# Patient Record
Sex: FEMALE | Race: WHITE | Employment: FULL TIME | URBAN - METROPOLITAN AREA
[De-identification: names, ages, dates, MRNs, and addresses within clinical notes are randomized per-mention and may not be internally consistent; named-entity substitution may affect disease eponyms.]

---

## 2022-07-05 ENCOUNTER — HOSPITAL ENCOUNTER (OUTPATIENT)
Dept: ULTRASOUND IMAGING | Age: 57
Discharge: HOME | End: 2022-07-05
Payer: COMMERCIAL

## 2022-07-05 DIAGNOSIS — Z85.3 PERSONAL HISTORY OF MALIGNANT NEOPLASM OF BREAST: ICD-10-CM

## 2022-07-05 PROCEDURE — 76700 US EXAM ABDOM COMPLETE: CPT

## 2022-07-28 NOTE — PROGRESS NOTES
..1.  Do not eat or drink anything after 12 midnight prior to surgery. This includes no water, chewing gum or mints, except for bowel prep complete per MD.  2.  Take the following pills with a small sip of water on the morning of surgery 08/05/2022.  3.  Aspirin, Ibuprofen, Advil, Naproxen, Vitamin E and other Anti-inflammatory products should be stopped for 5 days before surgery or as directed by your physician. 4.  Check with your doctor regarding stopping Plavix, Coumadin, Lovenox, Fragmin or other blood thinners. 5.  Do not smoke and do not drink alcoholic beverages 24 hours prior to surgery. This includes NA Beer. 6.  You may brush your teeth and gargle the morning of surgery. DO NOT SWALLOW WATER.  7.  You MUST make arrangements for a responsible adult to take you home after your surgery. You will not be allowed to leave alone or drive yourself home. It is strongly suggested someone stay with you the first 24 hours. Your surgery will be cancelled if you do not have a ride home. 8.  A parent/legal guardian must accompany a child scheduled for surgery and plan to stay at the hospital until the child is discharged. Please do not bring other children with you. 9.  Please wear simple, loose fitting clothing to the hospital.  Dieter Bars not bring valuables ( money, credit cards, checkbooks, etc.)  Do not wear any makeup (including no eye makeup) or nail polish on your fingers or toes. 10.  Do not wear any jewelry or piercing on the day of surgery. All body piercing jewelry must be removed. 11.  If you have dentures, they will be removed before going to the OR; we will provide you a container. If you wear contact lenses or glasses, they will be removed; please bring a case for them.   15.  Notify your Surgeon if you develop any illness between now and surgery time; cough, cold, fever, sore throat, nausea, vomiting, etc.  Please notify your surgeon if you experience dizziness, shortness of breath or blurred vision between now and the time of your surgery. To provide excellent care, visitors will be limited to two in a room at any given time. Please no children under the age of 15 in the surgical department.

## 2022-07-28 NOTE — PROGRESS NOTES
PAT completed with patient orders placed per MD, patient states her mother Jerrica Durand will be  and caregiver day of procedure and that 's office called her 07/27/2022 and told her procedure is scheduled for 1000 with 0900 arrival time. Sary Cisse RN

## 2022-08-01 ENCOUNTER — ANESTHESIA EVENT (OUTPATIENT)
Dept: ENDOSCOPY | Age: 57
End: 2022-08-01
Payer: COMMERCIAL

## 2022-08-05 ENCOUNTER — ANESTHESIA (OUTPATIENT)
Dept: ENDOSCOPY | Age: 57
End: 2022-08-05
Payer: COMMERCIAL

## 2022-08-05 NOTE — PROGRESS NOTES
New dateand time given for procedure. 8/9/22 arrive at 0930 Instructed to be NPO except for thyroid and stomach medicine. Verbalized understanding.

## 2022-08-09 ENCOUNTER — HOSPITAL ENCOUNTER (OUTPATIENT)
Age: 57
Setting detail: OUTPATIENT SURGERY
Discharge: HOME | End: 2022-08-09
Attending: INTERNAL MEDICINE | Admitting: INTERNAL MEDICINE
Payer: COMMERCIAL

## 2022-08-09 VITALS
DIASTOLIC BLOOD PRESSURE: 66 MMHG | RESPIRATION RATE: 16 BRPM | OXYGEN SATURATION: 99 % | HEIGHT: 62 IN | TEMPERATURE: 98 F | HEART RATE: 82 BPM | SYSTOLIC BLOOD PRESSURE: 100 MMHG | BODY MASS INDEX: 34.96 KG/M2 | WEIGHT: 190 LBS

## 2022-08-09 DIAGNOSIS — K31.89 GASTROPTOSIS: ICD-10-CM

## 2022-08-09 PROCEDURE — 2580000003 HC RX 258: Performed by: NURSE ANESTHETIST, CERTIFIED REGISTERED

## 2022-08-09 PROCEDURE — 3700000001 HC ADD 15 MINUTES (ANESTHESIA): Performed by: INTERNAL MEDICINE

## 2022-08-09 PROCEDURE — 88305 TISSUE EXAM BY PATHOLOGIST: CPT

## 2022-08-09 PROCEDURE — 7100000010 HC PHASE II RECOVERY - FIRST 15 MIN: Performed by: INTERNAL MEDICINE

## 2022-08-09 PROCEDURE — 3609013500 HC EGD REMOVAL TUMOR POLYP/OTHER LESION SNARE TECH: Performed by: INTERNAL MEDICINE

## 2022-08-09 PROCEDURE — 7100000011 HC PHASE II RECOVERY - ADDTL 15 MIN: Performed by: INTERNAL MEDICINE

## 2022-08-09 PROCEDURE — 6360000002 HC RX W HCPCS: Performed by: NURSE ANESTHETIST, CERTIFIED REGISTERED

## 2022-08-09 PROCEDURE — 2709999900 HC NON-CHARGEABLE SUPPLY: Performed by: INTERNAL MEDICINE

## 2022-08-09 PROCEDURE — 3609018500 HC EGD US SCOPE W/ADJACENT STRUCTURES: Performed by: INTERNAL MEDICINE

## 2022-08-09 PROCEDURE — 3700000000 HC ANESTHESIA ATTENDED CARE: Performed by: INTERNAL MEDICINE

## 2022-08-09 RX ORDER — SODIUM CHLORIDE, SODIUM LACTATE, POTASSIUM CHLORIDE, CALCIUM CHLORIDE 600; 310; 30; 20 MG/100ML; MG/100ML; MG/100ML; MG/100ML
INJECTION, SOLUTION INTRAVENOUS CONTINUOUS PRN
Status: DISCONTINUED | OUTPATIENT
Start: 2022-08-09 | End: 2022-08-09 | Stop reason: SDUPTHER

## 2022-08-09 RX ORDER — PROPOFOL 10 MG/ML
INJECTION, EMULSION INTRAVENOUS PRN
Status: DISCONTINUED | OUTPATIENT
Start: 2022-08-09 | End: 2022-08-09 | Stop reason: SDUPTHER

## 2022-08-09 RX ORDER — SODIUM CHLORIDE, SODIUM LACTATE, POTASSIUM CHLORIDE, CALCIUM CHLORIDE 600; 310; 30; 20 MG/100ML; MG/100ML; MG/100ML; MG/100ML
INJECTION, SOLUTION INTRAVENOUS CONTINUOUS
Status: DISCONTINUED | OUTPATIENT
Start: 2022-08-09 | End: 2022-08-09 | Stop reason: HOSPADM

## 2022-08-09 RX ORDER — SODIUM CHLORIDE 9 MG/ML
INJECTION, SOLUTION INTRAVENOUS PRN
Status: DISCONTINUED | OUTPATIENT
Start: 2022-08-09 | End: 2022-08-09 | Stop reason: HOSPADM

## 2022-08-09 RX ORDER — FAMOTIDINE 10 MG/ML
20 INJECTION, SOLUTION INTRAVENOUS ONCE
Status: DISCONTINUED | OUTPATIENT
Start: 2022-08-09 | End: 2022-08-09 | Stop reason: HOSPADM

## 2022-08-09 RX ORDER — PROPOFOL 10 MG/ML
INJECTION, EMULSION INTRAVENOUS CONTINUOUS PRN
Status: DISCONTINUED | OUTPATIENT
Start: 2022-08-09 | End: 2022-08-09 | Stop reason: SDUPTHER

## 2022-08-09 RX ORDER — SODIUM CHLORIDE 0.9 % (FLUSH) 0.9 %
5-40 SYRINGE (ML) INJECTION EVERY 12 HOURS SCHEDULED
Status: DISCONTINUED | OUTPATIENT
Start: 2022-08-09 | End: 2022-08-09 | Stop reason: HOSPADM

## 2022-08-09 RX ORDER — SODIUM CHLORIDE 0.9 % (FLUSH) 0.9 %
5-40 SYRINGE (ML) INJECTION PRN
Status: DISCONTINUED | OUTPATIENT
Start: 2022-08-09 | End: 2022-08-09 | Stop reason: HOSPADM

## 2022-08-09 RX ADMIN — PROPOFOL 100 MG: 10 INJECTION, EMULSION INTRAVENOUS at 11:29

## 2022-08-09 RX ADMIN — PROPOFOL 50 MG: 10 INJECTION, EMULSION INTRAVENOUS at 11:32

## 2022-08-09 RX ADMIN — PROPOFOL 125 MCG/KG/MIN: 10 INJECTION, EMULSION INTRAVENOUS at 11:29

## 2022-08-09 RX ADMIN — SODIUM CHLORIDE, POTASSIUM CHLORIDE, SODIUM LACTATE AND CALCIUM CHLORIDE: 600; 310; 30; 20 INJECTION, SOLUTION INTRAVENOUS at 11:25

## 2022-08-09 ASSESSMENT — PAIN - FUNCTIONAL ASSESSMENT: PAIN_FUNCTIONAL_ASSESSMENT: 0-10

## 2022-08-09 NOTE — ANESTHESIA PRE PROCEDURE
Department of Anesthesiology  Preprocedure Note       Name:  Kenroy Matias   Age:  55 y.o.  :  1965                                          MRN:  [id number]         Date:  2022      Surgeon: Jasmin Ramirez):  Destiney Gaona MD    Procedure: Procedure(s):  UPPER EUS (11:00)  EGD W/ANES. Medications prior to admission:   Prior to Admission medications    Medication Sig Start Date End Date Taking? Authorizing Provider   atorvastatin (LIPITOR) 10 MG tablet Take 10 mg by mouth daily    Historical Provider, MD   pantoprazole (PROTONIX) 40 MG tablet Take 40 mg by mouth daily. Historical Provider, MD   venlafaxine (EFFEXOR) 75 MG tablet Take 100 mg by mouth 2 times daily. Historical Provider, MD   levothyroxine (SYNTHROID) 25 MCG tablet Take 137 mcg by mouth Daily. Historical Provider, MD   lisinopril-hydrochlorothiazide (PRINZIDE;ZESTORETIC) 10-12.5 MG per tablet Take 1 tablet by mouth daily. Historical Provider, MD   Multiple Minerals-Vitamins CAPS Take  by mouth. Historical Provider, MD       Current medications:    Current Facility-Administered Medications   Medication Dose Route Frequency Provider Last Rate Last Admin    lactated ringers infusion   IntraVENous Continuous Destiney Gaona MD        famotidine (PEPCID) injection 20 mg  20 mg IntraVENous Once Pascale Schmidt MD        lactated ringers infusion   IntraVENous Continuous Pascale Schmidt MD        sodium chloride flush 0.9 % injection 5-40 mL  5-40 mL IntraVENous 2 times per day Pascale Schmidt MD        sodium chloride flush 0.9 % injection 5-40 mL  5-40 mL IntraVENous PRN Pascale Schmidt MD        0.9 % sodium chloride infusion   IntraVENous PRN Pascale Schmidt MD           Allergies: Allergies   Allergen Reactions    Etodolac Itching     Pt. Does not remember the reaction.  Lisinopril      cough    Naproxen Itching     Pt.  Does not remember reaction       Problem List: Patient Active Problem List   Diagnosis Code    Condyloma A63.0    Plantar fasciitis, left M72.2       Past Medical History:        Diagnosis Date    Anxiety     Arthritis     Breast cancer ([address]     Depression     Hypertension     Thyroid disease        Past Surgical History:        Procedure Laterality Date    APPENDECTOMY      CHOLECYSTECTOMY      HYSTERECTOMY (CERVIX STATUS UNKNOWN)      MASTECTOMY, MODIFIED RADICAL Bilateral     TONSILLECTOMY         Social History:    Social History     Tobacco Use    Smoking status: Former     Years: 30.00     Types: Cigarettes    Smokeless tobacco: Never   Substance Use Topics    Alcohol use: No                                Counseling given: Not Answered      Vital Signs (Current):   Vitals:    07/28/22 1326 08/09/22 0931   BP:  117/77   Pulse:  88   Resp:  18   Temp:  97.9 °F (36.6 °C)   TempSrc:  Tympanic   SpO2:  97%   Weight: 190 lb (86.2 kg)    Height: 5' 1.5\" (1.562 m)                                               BP Readings from Last 3 Encounters:   08/09/22 117/77   06/12/17 122/74   06/13/16 120/72       NPO Status: Time of last liquid consumption: 2230                        Time of last solid consumption: 1800                        Date of last liquid consumption: 08/08/22                        Date of last solid food consumption: 08/08/22    BMI:   Wt Readings from Last 3 Encounters:   07/28/22 190 lb (86.2 kg)   06/12/17 209 lb 9.6 oz (95.1 kg)   06/13/16 208 lb 6.4 oz (94.5 kg)     Body mass index is 35.32 kg/m².     CBC:   Lab Results   Component Value Date/Time    WBC 8.5 06/12/2017 11:38 AM    RBC 5.17 06/12/2017 11:38 AM    HGB 15.8 06/12/2017 11:38 AM    HCT 49.3 06/12/2017 11:38 AM    MCV 95.3 06/12/2017 11:38 AM    RDW 11.5 06/12/2017 11:38 AM     06/12/2017 11:38 AM       CMP:   Lab Results   Component Value Date/Time     06/12/2017 11:38 AM    K 4.3 06/12/2017 11:38 AM     06/12/2017 11:38 AM    CO2 29 06/12/2017 11:38 AM    BUN 17 06/12/2017 11:38 AM    CREATININE 0.81 06/12/2017 11:38 AM    AGRATIO 1.6 06/12/2017 11:38 AM    LABGLOM >60.0 06/12/2017 11:38 AM    GLUCOSE 106 06/12/2017 11:38 AM    PROT 7.8 06/12/2017 11:38 AM    CALCIUM 10.4 06/12/2017 11:38 AM    BILITOT 0.9 06/12/2017 11:38 AM    ALKPHOS 114 06/12/2017 11:38 AM    AST 35 06/12/2017 11:38 AM    ALT 53 06/12/2017 11:38 AM       POC Tests: No results for input(s): POCGLU, POCNA, POCK, POCCL, POCBUN, POCHEMO, POCHCT in the last 72 hours. Coags: No results found for: PROTIME, INR, APTT    HCG (If Applicable): No results found for: PREGTESTUR, PREGSERUM, HCG, HCGQUANT     ABGs: No results found for: PHART, PO2ART, BFJ8LLO, GWA2BGX, BEART, Z0VMIBSN     Type & Screen (If Applicable):  No results found for: LABABO, LABRH    Drug/Infectious Status (If Applicable):  No results found for: HIV, HEPCAB    COVID-19 Screening (If Applicable): No results found for: COVID19        Anesthesia Evaluation  Patient summary reviewed and Nursing notes reviewed no history of anesthetic complications:   Airway: Mallampati: II     Neck ROM: full     Dental:          Pulmonary:Negative Pulmonary ROS and normal exam                               Cardiovascular:Negative CV ROS    (+) hypertension:,                   Neuro/Psych:   Negative Neuro/Psych ROS  (+) psychiatric history:depression/anxiety             GI/Hepatic/Renal: Neg GI/Hepatic/Renal ROS       (-) hiatal hernia and GERD       Endo/Other: Negative Endo/Other ROS   (+) hypothyroidism: arthritis:., .                 Abdominal:             Vascular: Other Findings:           Anesthesia Plan      general     ASA 3     (I discussed with the patient the risks and benefits of PIV, general anesthesia, IV Narcotics, PACU. All questions were answered the patient agrees with the plan and wishes to proceed.  )  Induction: intravenous.                             Keyur Cherry MD   8/9/2022

## 2022-08-09 NOTE — H&P
[address] [facility] [address],  [address], [address] [place]  Phone: [phone]  [facility]  Phone: [phone]   WWL:[phone]    CHIEF COMPLAINT     Gastric submucosal nodule    HPI     Thank you Dr. Wilmer Schmidt for asking me to see Chidi Retana in consultation. Chidi Retana is a 55 y.o. female Single [1] White (non-) [1] with medical history of depression, anxiety, hypertension, hypothyroidism, breast cancer status post chemotherapy and mastectomy in remission, tobacco use, lactose intolerance and abdominal surgeries of appendectomy and cholecystectomy seen with referral for EUG to evaluate gastric submucosal nodule seen on EGD 5/23/22. Patient follows with GI, Dr. Genaro Crump for abdominal pain and diarrhea. Denies abdominal pain, nausea, vomiting, fever, chills, unintentional weight loss, constipation, hematochezia or melenic stools. Last EGD 5/23/22 Dr. Genaro Crump: Mild erosive antritis (HP/IM negative). Small 5 mm submucosal nodule in the lesser curvature of antrum. Normal duodenum    Last colonoscopy 5/23/22 Dr. Genaro Crump:  Left-sided diverticulosis. Normal terminal ileum. 2 small rectal hyperplastic polyps. Random colon biopsies (normal)      Review of available records reveals:   Wt Readings from Last 50 Encounters:   07/28/22 190 lb (86.2 kg)   06/12/17 209 lb 9.6 oz (95.1 kg)   06/13/16 208 lb 6.4 oz (94.5 kg)   02/18/16 205 lb (93 kg)   11/18/14 203 lb (92.1 kg)       No components found for: HGBA1C  BP Readings from Last 3 Encounters:   08/09/22 117/77   06/12/17 122/74   06/13/16 120/72     Health Maintenance   Topic Date Due    Depression Screen  Never done    HIV screen  Never done    Hepatitis C screen  Never done    Diabetes screen  Never done    Breast cancer screen  Never done    Colorectal Cancer Screen  Never done    COVID-19 Vaccine (4 - Booster for Flores Peter series) 04/02/2022    Flu vaccine (1) 09/01/2022    Lipids 07/27/2023    DTaP/Tdap/Td vaccine (3 - Td or Tdap) 08/10/2031    Shingles vaccine  Completed    Hepatitis A vaccine  Aged Out    Hepatitis B vaccine  Aged Out    Hib vaccine  Aged Out    Meningococcal (ACWY) vaccine  Aged Out    Pneumococcal 0-64 years Vaccine  Aged Out       No components found for: [facility]     PAST MEDICAL HISTORY     Past Medical History:   Diagnosis Date    Anxiety     Arthritis     Breast cancer ([address]     Depression     Hypertension     Thyroid disease      FAMILY HISTORY     Family History   Problem Relation Age of Onset    Heart Disease Maternal Grandfather     Cancer Paternal Grandmother     Cancer Paternal Grandfather      SOCIAL HISTORY     Social History     Socioeconomic History    Marital status: Single     Spouse name: Not on file    Number of children: Not on file    Years of education: Not on file    Highest education level: Not on file   Occupational History    Not on file   Tobacco Use    Smoking status: Former     Years: 30.00     Types: Cigarettes    Smokeless tobacco: Never   Vaping Use    Vaping Use: Every day   Substance and Sexual Activity    Alcohol use: No    Drug use: Not on file    Sexual activity: Not on file   Other Topics Concern    Not on file   Social History Narrative    Not on file     Social Determinants of Health     Financial Resource Strain: Not on file   Food Insecurity: Not on file   Transportation Needs: Not on file   Physical Activity: Not on file   Stress: Not on file   Social Connections: Not on file   Intimate Partner Violence: Not on file   Housing Stability: Not on file     SURGICAL HISTORY     Past Surgical History:   Procedure Laterality Date    APPENDECTOMY      CHOLECYSTECTOMY      HYSTERECTOMY (CERVIX STATUS UNKNOWN)      MASTECTOMY, MODIFIED RADICAL Bilateral     TONSILLECTOMY       CURRENT MEDICATIONS   (This list may include medications prescribed during this encounter as epic can not insert only the list prior to this encounter.)  REM    ALLERGIES     Allergies   Allergen Reactions    Etodolac Itching     Pt. Does not remember the reaction. Lisinopril      cough    Naproxen Itching     Pt. Does not remember reaction     IMMUNIZATIONS     Immunization History   Administered Date(s) Administered    COVID-19, PFIZER PURPLE top, DILUTE for use, (age 15 y+), 30mcg/0.3mL 02/26/2021, 03/18/2021, 12/02/2021     REVIEW OF SYSTEMS   See HPI for further details and pertinent postiives. Negative for the following:  Constitutional: Negative for weight change. Negative for appetite change and fatigue. HENT: Negative for nosebleeds, sore throat, mouth sores, and voice change. Respiratory: Negative for cough, choking and chest tightness. Cardiovascular: Negative for chest pain   Gastrointestinal: See HPI  Musculoskeletal: Negative for arthralgias. Skin: Negative for pallor. Neurological: Negative for weakness and light-headedness. Hematological: Negative for adenopathy. Does not bruise/bleed easily. Psychiatric/Behavioral: Negative for suicidal ideas. PHYSICAL EXAM   VITAL SIGNS: /77   Pulse 88   Temp 97.9 °F (36.6 °C) (Tympanic)   Resp 18   Ht 5' 1.5\" (1.562 m)   Wt 190 lb (86.2 kg)   SpO2 97%   BMI 35.32 kg/m²   Wt Readings from Last 3 Encounters:   07/28/22 190 lb (86.2 kg)   06/12/17 209 lb 9.6 oz (95.1 kg)   06/13/16 208 lb 6.4 oz (94.5 kg)     Constitutional: Well developed, Well nourished, No acute distress, Non-toxic appearance. HENT: Normocephalic, Atraumatic, Bilateral external ears normal, Oropharynx moist, No oral exudates, Nose normal.   Eyes: Conjunctiva normal, No discharge. Neck: Normal range of motion, No tenderness  Cardiovascular: Normal heart rate, Normal rhythm, No murmurs, No rubs, No gallops. Thorax & Lungs: Normal breath sounds, No respiratory distress, No wheezing,   Abdomen:  normal bowel sounds, soft, non tender, non distended,no hernias   Rectal:  Deferred.   Skin: Warm, Dry, No erythema, No rash. No bruising. Lower Extremities: Intact distal pulses, No edema, No tenderness, No cyanosis, No clubbing. Neurologic: Alert & oriented x 3  RADIOLOGY/PROCEDURES   No results found. FINAL IMPRESSION   Gastric antral submucosal nodule    Plan: Upper EUS    Esophagogastroduodenoscopy (EGD) and Endoscopic Ultrasonography (EUS) alternatives, rationale, benefits and risks, not limited to bleeding, infection, perforation, need of surgery, prolong hospital stay and anesthesia side effects were explained. Additional risk of pancreatitis if fine needle aspiration (FNA) is performed. Patient verbalized understanding and agrees to proceed with EGD/EUS. ORDERED FUTURE/PENDING TESTS     Lab Frequency Next Occurrence       FOLLOWUP   No follow-ups on file.           Bayron Youssef MD 8/4/22 9:06 PM EDT    CC:  Dio Pickard MD

## 2022-08-09 NOTE — DISCHARGE INSTRUCTIONS
PATIENT INSTRUCTIONS  POST-SEDATION    Clarice Patrick          IMMEDIATELY FOLLOWING PROCEDURE:    Do not drive or operate machinery for the first twenty four hours after surgery. Do not make any important decisions for twenty four hours after surgery or while taking narcotic pain medications or sedatives. You should NOT BE LEFT UNATTENDED OR ALONE. A responsible adult should be with you for the rest of the day of your procedure and also during the night for your protection and safety. You may experience some light headedness. Rest at home with activity as tolerated. You may not need to go to bed, but it is important to rest for the next 24 hours. You should not engage in athletic sports such as basketball, volleyball, jogging, skating, or activities requiring refined motor skills for 24 hours. If you develop intractable nausea and vomiting or a severe headache please notify your doctor immediately. You are not expected to have any fever, but if you feel warm, take your temperature. If you have a fever 101 degrees or higher, call your doctor. If you have had an Endoscopy:   *Eat lightly for your first meal and gradually resume your normal / prescribed diet. DO NOT eat or drink until your gag reflex returns. *If you have a sore throat you may use lozenges, or salt water gargles. ONCE YOU ARE HOME, IF YOU SHOULD HAVE:  Difficulty in breathing, persistent nausea or vomiting, bleeding you feel is excessive, or pain that is unusual, increased abdominal bloating, or any swelling, fever / chills, call your physician. If you cannot contact your physician, but feel that your signs and symptoms need a physician's attention, go to the Emergency Department. FOLLOW-UP:    Please follow up with Dr. Donovan Dailey as scheduled or needed. Dr. Rashel Stratton office will call you with the biopsy findings. Call Dr. Aneudy Estrada if there are any GI concerns.  [phone]  ANESTHESIA DISCHARGE INSTRUCTIONS    You are under the influence of drugs- do not drink alcohol, drive a car, operate machinery(such as power tools, kitchen appliances, etc), sign legal documents, or make any important decisions for 24 hours (or while on pain medications). Children should not ride bikes or [place] or play on gym sets  for 24 hours after surgery. A responsible adult should be with you for 24 hours. Rest at home today- increase activity as tolerated. Progress slowly to a regular diet unless your physician has instructed you otherwise. Drink plenty of water. CALL YOUR DOCTOR IF YOU:  Have moderate to severe nausea or vomiting AND are unable to hold down fluids or prescribed medications. Have bright red bloody drainage from your dressing that won't stop oozing. Do not get relief with your pain medication    NORMAL (POSSIBLE) SIDE EFFECTS FROM ANESTHESIA:     Confusion, temporary memory loss, delayed reaction times in the first 24 hours  Lightheadedness, dizziness, difficulty focusing, blurred vision  Nausea/vomiting can happen  Shivering, feeling cold, sore throat, cough and muscle aches should stop within 24-48 hours  Trouble urinating - call your surgeon if it has been more than 8 hrs  Bruising or soreness at the IV site - call if it remains red, firm or there is drainage             FEMALES OF CHILDBEARING AGE WHO ARE TAKING BIRTH CONTROL PILLS:  You may have received a medication during your procedure that interferes with the   actions of birth control pills (Bridion or Emend). Use some other kind of birth control in addition to your pills, like a condom, for 1 month after your procedure to prevent unwanted pregnancy. The following instructions are to be followed if you have a known history or diagnosis of sleep apnea: For all sleep apnea patients:  ? Sleep on your side or sitting up in a chair whenever possible, especially the first 24 hours after surgery. ? Use only medicines prescribed by your doctor.     ? Do not drink alcohol. ? If you have a dental device to assist you while at rest, use it at all times for the first 24 hours. For patients using CPAP machines:  ? Use your CPAP machine during all periods of sleep as usual.  ? Use your CPAP machine during all periods of daytime rest while on pain medicines. ** Follow up with your primary care doctor for continued care. IF YOU DO NOT TAKE ALL OF YOUR NARCOTIC PAIN MEDICATION, please dispose of them responsibly. There are drop off boxes in the Emergency Departments 24/7 at both [facility] and [facility]. If these locations are not convenient, other options for discarding them can be found at:  [email or web address]    Hospital or office staff may NOT accept any medications to drop off in the cabinet for you.

## 2022-08-09 NOTE — OP NOTE
[address] [facility] [address],  [address]  9 [facility], [address] [place] [address]  Phone: [phone]   IED:[phone]   [address]  [facility]  Phone: [phone]   PJE:[phone]    Esophagogastroduodenoscopy with snare cautery polypectomy: Upper Endoscopic Ultrasound Procedure Note    Patient: River Almanzar  : 1965    Procedure: EGD with snare cautery polypectomy, Upper EUS    Date:  2022     Endoscopist:  Gabby Lofton MD    Referring Physician:  Peyton Richey MD    Preoperative Diagnosis:  gastric antral nodule    Postoperative Diagnosis: duodenal bulb polyp, gastric antral lipoma    Anesthesia: Anesthesia: MAC  ASA Class: 2  Mallampati: II (soft palate, uvula, fauces visible)    Indications: This is a 55y.o. year old female with medical history of depression, anxiety, hypertension, hypothyroidism, breast cancer status post chemotherapy and mastectomy in remission, tobacco use, lactose intolerance and abdominal surgeries of appendectomy and cholecystectomy seen with referral for EUG to evaluate gastric submucosal nodule seen on EGD 22. Patient follows with GI, Dr. Rashel Lott for abdominal pain and diarrhea. Procedure Details   Prior to the procedure, a history and physical was performed, and patient medications and allergies were reviewed. The patient's tolerance of previous anesthesia was also reviewed. Informed consent was obtained for the procedure, including anesthesia, risks of infection, perforation, hemorrhage, pancreatitis, adverse drug reaction were discussed. All questions were answered, and informed consent was obtained. Prior anticoagulants: no previous anticoagulant or antiplatelet agents. After obtaining informed consent, the patient was sedated with the above IV sedation. She was monitored continuously with ECG tracing, pulse oximetry, blood pressure monitoring, and direct observation. The duodenoscope was passed. Appropriate photodocumentation Was Obtained.   If photos taken, they were ordered to be scanned into the medical record. The EUS was accomplished with ease. The patient tolerated the procedure well. On the endoscopic exam, a standard endoscope was inserted into the mouth and passed to the second portion of duodenum to evaluate the upper gi tract. On the ultrasonographic examination, the linear echoendoscope was advanced through the mouth into stomach to evaluate the antral submucosal nodule. Water was used as an echogenic medium. EGD Findings:  Normal upper and mid esophagus  Irregular Z-line at 37 cm from incisors  Mild erythematous mucosa in antrum and body of stomach suggestive of mild gastritis. A 5 mm submucosal nodule with overlying whitish hue in the lesser curvature of the antrum. A 10 mm semi-pedunculated polyp (Isp) in the duodenal bulb, removed with snare cautery polypectomy. No bleeding noted. Retrieved with a russo net. Normal second portion of duodenum. EUS Findings:  Examined portion of the stomach appeared normal.  There was a 5 mm submucosal nodule with overlying whitish hue in the lesser curvature of the antrum. Ultrasound images Identified a 8.3 mm by 2.1 mm hyperechoic lesion arising from the submucosa (L3), consistent with a lipoma. Specimens: duodenal bulb polyp           Complications:\"None; patient tolerated the procedure well. \"    Estimated blood loss: None or minimal            Disposition: PACU - hemodynamically stable. Condition: stable    EGD Impression:    Normal upper and mid esophagus  Irregular Z-line at 37 cm from incisors  Mild  gastritis. A 5 mm submucosal nodule with overlying whitish hue in the lesser curvature of the antrum. A 10 mm semi-pedunculated polyp (Isp) in the duodenal bulb, removed with snare cautery polypectomy and retrieved. Normal second portion of duodenum. EUS Impression:  A 5 mm submucosal nodule with overlying whitish hue in the lesser curvature of the antrum.  Ultrasound images identified a 8.3 mm by 2.1 mm hyperechoic lesion arising from the submucosa (L3), consistent with a lipoma. Recommendations:  -Await pathology. ,   -Follow up with GI, Dr. Guerita Salazar MD   [facility]  8/9/22 11:45 AM EDT    Please note that some or all of this record was generated using voice recognition software. If there are any questions about the content of this document, please contact the author as some errors in translation may have occurred.

## 2022-08-09 NOTE — ANESTHESIA POSTPROCEDURE EVALUATION
Department of Anesthesiology  Postprocedure Note    Patient: Bebe Juarez  MRN: [id number]  YOB: 1965  Date of evaluation: 8/9/2022      Procedure Summary     Date: 08/09/22 Room / Location: [address] [place] [address]'[facility]    Anesthesia Start: 1125 Anesthesia Stop: 9513    Procedures:       UPPER EUS (11:00)      EGD POLYP SNARE Diagnosis:       Gastroptosis      (ANTRAL SUBMUCOSAL NODULE)    Surgeons: Shyla Matt MD Responsible Provider: Antonella Zaidi MD    Anesthesia Type: general ASA Status: 3          Anesthesia Type: No value filed.     Duke Phase I: Duke Score: 10    Duke Phase II: Duke Score: 9      Anesthesia Post Evaluation    Comments: Postoperative Anesthesia Note    Name:    Bebe Juarez  MRN:      [id number]    Patient Vitals in the past 12 hrs:  08/09/22 1225, BP:100/66, Pulse:82, Resp:16, SpO2:99 %  08/09/22 1216, BP:91/61, Temp:98 °F (36.7 °C), Temp src:Temporal, Pulse:76, Resp:16, SpO2:96 %  08/09/22 0931, BP:117/77, Temp:97.9 °F (36.6 °C), Temp src:Tympanic, Pulse:88, Resp:18, SpO2:97 %     LABS:    CBC  Lab Results       Component                Value               Date/Time                  WBC                      8.5                 06/12/2017 11:38 AM        HGB                      15.8                06/12/2017 11:38 AM        HCT                      49.3 (H)            06/12/2017 11:38 AM        PLT                      114 (L)             06/12/2017 11:38 AM   RENAL  Lab Results       Component                Value               Date/Time                  NA                       143                 06/12/2017 11:38 AM        K                        4.3                 06/12/2017 11:38 AM        CL                       102                 06/12/2017 11:38 AM        CO2                      29                  06/12/2017 11:38 AM        BUN                      17                  06/12/2017 11:38 AM        CREATININE               0.81 06/12/2017 11:38 AM        GLUCOSE                  106                 06/12/2017 11:38 AM   COAGS  No results found for: PROTIME, INR, APTT    Intake & Output:  @89WOMR@    Nausea & Vomiting:  No    Level of Consciousness:  Awake    Pain Assessment:  Adequate analgesia    Anesthesia Complications:  No apparent anesthetic complications    SUMMARY      Vital signs stable  OK to discharge from Stage I post anesthesia care.   Care transferred from Anesthesiology department on discharge from perioperative area

## (undated) DEVICE — YANKAUER,BULB TIP,W/O VENT,RIGID,STERILE: Brand: MEDLINE

## (undated) DEVICE — THE DISPOSABLE ROTH NET FOREIGN BODY STANDARD RETRIEVAL DEVICE IS USED IN THE ENDOSCOPIC RETRIEVAL OF FOREIGN BODY, FOOD BOLUS AND EXCISED TISSUE SUCH AS POLYPS.: Brand: ROTH NET

## (undated) DEVICE — TRAP POLYP ETRAP

## (undated) DEVICE — CANNULA,OXY,ADULT,SUPERSOFT,W/7'TUB,SC: Brand: MEDLINE INDUSTRIES, INC.

## (undated) DEVICE — ENDOSCOPIC KIT 2 12 FT OP4 DE2 GWN SYR

## (undated) DEVICE — Device: Brand: DISPOSABLE ELECTROSURGICAL SNARE

## (undated) DEVICE — ENDO CARRY-ON PROCEDURE KIT INCLUDES SUCTION TUBING, LUBRICANT, GAUZE, BIOHAZARD STICKER, TRANSPORT PAD AND INTERCEPT BEDSIDE KIT.: Brand: ENDO CARRY-ON PROCEDURE KIT

## (undated) DEVICE — ELECTRODE PT RET AD L9FT HI MOIST COND ADH HYDRGEL CORDED

## (undated) DEVICE — ELECTRODE ECG MONITR FOAM TEAR DROP ADLT RED

## (undated) DEVICE — CONMED SCOPE SAVER BITE BLOCK, 20X27 MM: Brand: SCOPE SAVER